# Patient Record
Sex: FEMALE | Race: WHITE | Employment: UNEMPLOYED | URBAN - NONMETROPOLITAN AREA
[De-identification: names, ages, dates, MRNs, and addresses within clinical notes are randomized per-mention and may not be internally consistent; named-entity substitution may affect disease eponyms.]

---

## 2017-11-15 PROBLEM — E03.9 HYPOTHYROIDISM: Status: ACTIVE | Noted: 2017-11-15

## 2017-11-15 PROBLEM — I10 ESSENTIAL HYPERTENSION: Status: ACTIVE | Noted: 2017-11-15

## 2018-01-01 ENCOUNTER — OFFICE VISIT (OUTPATIENT)
Dept: PRIMARY CARE CLINIC | Age: 83
End: 2018-01-01
Payer: MEDICARE

## 2018-01-01 ENCOUNTER — TELEPHONE (OUTPATIENT)
Dept: PRIMARY CARE CLINIC | Age: 83
End: 2018-01-01

## 2018-01-01 ENCOUNTER — HOSPITAL ENCOUNTER (OUTPATIENT)
Dept: INPATIENT UNIT | Age: 83
Setting detail: THERAPIES SERIES
Discharge: HOME | End: 2018-12-28
Payer: MEDICARE

## 2018-01-01 VITALS
SYSTOLIC BLOOD PRESSURE: 106 MMHG | DIASTOLIC BLOOD PRESSURE: 70 MMHG | RESPIRATION RATE: 16 BRPM | HEART RATE: 92 BPM | OXYGEN SATURATION: 96 % | HEIGHT: 58 IN | WEIGHT: 154.1 LBS | BODY MASS INDEX: 32.35 KG/M2

## 2018-01-01 VITALS
SYSTOLIC BLOOD PRESSURE: 118 MMHG | TEMPERATURE: 97.6 F | HEART RATE: 107 BPM | RESPIRATION RATE: 16 BRPM | DIASTOLIC BLOOD PRESSURE: 55 MMHG | OXYGEN SATURATION: 100 %

## 2018-01-01 DIAGNOSIS — I50.9 CHRONIC CONGESTIVE HEART FAILURE, UNSPECIFIED HEART FAILURE TYPE (HCC): ICD-10-CM

## 2018-01-01 DIAGNOSIS — I20.9 ANGINA PECTORIS (HCC): ICD-10-CM

## 2018-01-01 DIAGNOSIS — R00.0 TACHYCARDIA: Primary | ICD-10-CM

## 2018-01-01 DIAGNOSIS — I49.9 IRREGULAR HEART BEAT: ICD-10-CM

## 2018-01-01 DIAGNOSIS — C88.0 WALDENSTROM'S MACROGLOBULINEMIA (HCC): ICD-10-CM

## 2018-01-01 DIAGNOSIS — I48.20 CHRONIC ATRIAL FIBRILLATION (HCC): Primary | ICD-10-CM

## 2018-01-01 PROCEDURE — G8427 DOCREV CUR MEDS BY ELIG CLIN: HCPCS | Performed by: NURSE PRACTITIONER

## 2018-01-01 PROCEDURE — G8482 FLU IMMUNIZE ORDER/ADMIN: HCPCS | Performed by: NURSE PRACTITIONER

## 2018-01-01 PROCEDURE — G8417 CALC BMI ABV UP PARAM F/U: HCPCS | Performed by: NURSE PRACTITIONER

## 2018-01-01 PROCEDURE — 1036F TOBACCO NON-USER: CPT | Performed by: NURSE PRACTITIONER

## 2018-01-01 PROCEDURE — 4040F PNEUMOC VAC/ADMIN/RCVD: CPT | Performed by: NURSE PRACTITIONER

## 2018-01-01 PROCEDURE — 99214 OFFICE O/P EST MOD 30 MIN: CPT | Performed by: NURSE PRACTITIONER

## 2018-01-01 PROCEDURE — P9016 RBC LEUKOCYTES REDUCED: HCPCS

## 2018-01-01 PROCEDURE — 1101F PT FALLS ASSESS-DOCD LE1/YR: CPT | Performed by: NURSE PRACTITIONER

## 2018-01-01 PROCEDURE — 86901 BLOOD TYPING SEROLOGIC RH(D): CPT

## 2018-01-01 PROCEDURE — 86900 BLOOD TYPING SEROLOGIC ABO: CPT

## 2018-01-01 PROCEDURE — 36415 COLL VENOUS BLD VENIPUNCTURE: CPT

## 2018-01-01 PROCEDURE — 86920 COMPATIBILITY TEST SPIN: CPT

## 2018-01-01 PROCEDURE — 1123F ACP DISCUSS/DSCN MKR DOCD: CPT | Performed by: NURSE PRACTITIONER

## 2018-01-01 PROCEDURE — 1090F PRES/ABSN URINE INCON ASSESS: CPT | Performed by: NURSE PRACTITIONER

## 2018-01-01 PROCEDURE — 93000 ELECTROCARDIOGRAM COMPLETE: CPT | Performed by: NURSE PRACTITIONER

## 2018-01-01 PROCEDURE — 2580000003 HC RX 258: Performed by: FAMILY MEDICINE

## 2018-01-01 PROCEDURE — G8598 ASA/ANTIPLAT THER USED: HCPCS | Performed by: NURSE PRACTITIONER

## 2018-01-01 PROCEDURE — 36430 TRANSFUSION BLD/BLD COMPNT: CPT

## 2018-01-01 PROCEDURE — 86850 RBC ANTIBODY SCREEN: CPT

## 2018-01-01 RX ORDER — 0.9 % SODIUM CHLORIDE 0.9 %
250 INTRAVENOUS SOLUTION INTRAVENOUS ONCE
Status: COMPLETED | OUTPATIENT
Start: 2018-01-01 | End: 2018-01-01

## 2018-01-01 RX ORDER — IPRATROPIUM BROMIDE AND ALBUTEROL SULFATE 2.5; .5 MG/3ML; MG/3ML
1 SOLUTION RESPIRATORY (INHALATION) EVERY 4 HOURS PRN
COMMUNITY

## 2018-01-01 RX ORDER — ACETAMINOPHEN 325 MG/1
650 TABLET ORAL EVERY 4 HOURS PRN
COMMUNITY

## 2018-01-01 RX ADMIN — SODIUM CHLORIDE 250 ML: 9 INJECTION, SOLUTION INTRAVENOUS at 12:01

## 2018-01-01 ASSESSMENT — ENCOUNTER SYMPTOMS
NAUSEA: 0
DIARRHEA: 0
SHORTNESS OF BREATH: 1
PHOTOPHOBIA: 0
BACK PAIN: 1
SINUS PRESSURE: 0
CONSTIPATION: 0
SINUS PAIN: 0
COUGH: 0

## 2018-01-01 ASSESSMENT — PATIENT HEALTH QUESTIONNAIRE - PHQ9
2. FEELING DOWN, DEPRESSED OR HOPELESS: 0
SUM OF ALL RESPONSES TO PHQ9 QUESTIONS 1 & 2: 0
SUM OF ALL RESPONSES TO PHQ QUESTIONS 1-9: 0
SUM OF ALL RESPONSES TO PHQ QUESTIONS 1-9: 0
1. LITTLE INTEREST OR PLEASURE IN DOING THINGS: 0

## 2018-11-15 PROBLEM — R55 NEUROCARDIOGENIC SYNCOPE: Status: ACTIVE | Noted: 2018-01-01

## 2018-11-15 PROBLEM — K21.9 GERD (GASTROESOPHAGEAL REFLUX DISEASE): Status: ACTIVE | Noted: 2018-01-01

## 2018-11-15 PROBLEM — I20.9 ANGINA PECTORIS (HCC): Status: ACTIVE | Noted: 2018-01-01

## 2018-11-15 PROBLEM — I25.10 CORONARY ATHEROSCLEROSIS: Status: ACTIVE | Noted: 2018-01-01

## 2018-11-15 PROBLEM — I50.9 CHF (CONGESTIVE HEART FAILURE) (HCC): Status: ACTIVE | Noted: 2018-01-01

## 2018-11-15 PROBLEM — C88.0 WALDENSTROM'S MACROGLOBULINEMIA (HCC): Status: ACTIVE | Noted: 2018-01-01

## 2018-11-15 PROBLEM — J45.909 ASTHMA: Status: ACTIVE | Noted: 2018-01-01

## 2018-12-05 NOTE — PATIENT INSTRUCTIONS
Patient Education        Atrial Fibrillation: Care Instructions  Your Care Instructions    Atrial fibrillation is an irregular and often fast heartbeat. Treating this condition is important for several reasons. It can cause blood clots, which can travel from your heart to your brain and cause a stroke. If you have a fast heartbeat, you may feel lightheaded, dizzy, and weak. An irregular heartbeat can also increase your risk for heart failure. Atrial fibrillation is often the result of another heart condition, such as high blood pressure or coronary artery disease. Making changes to improve your heart condition will help you stay healthy and active. Follow-up care is a key part of your treatment and safety. Be sure to make and go to all appointments, and call your doctor if you are having problems. It's also a good idea to know your test results and keep a list of the medicines you take. How can you care for yourself at home? Medicines    · Take your medicines exactly as prescribed. Call your doctor if you think you are having a problem with your medicine. You will get more details on the specific medicines your doctor prescribes.     · If your doctor has given you a blood thinner to prevent a stroke, be sure you get instructions about how to take your medicine safely. Blood thinners can cause serious bleeding problems.     · Do not take any vitamins, over-the-counter drugs, or herbal products without talking to your doctor first.    Lifestyle changes    · Do not smoke. Smoking can increase your chance of a stroke and heart attack. If you need help quitting, talk to your doctor about stop-smoking programs and medicines. These can increase your chances of quitting for good.     · Eat a heart-healthy diet.     · Stay at a healthy weight. Lose weight if you need to.     · Limit alcohol to 2 drinks a day for men and 1 drink a day for women. Too much alcohol can cause health problems.     · Avoid colds and flu.  Get a pneumococcal vaccine shot. If you have had one before, ask your doctor whether you need another dose. Get a flu shot every year. If you must be around people with colds or flu, wash your hands often. Activity    · If your doctor recommends it, get more exercise. Walking is a good choice. Bit by bit, increase the amount you walk every day. Try for at least 30 minutes on most days of the week. You also may want to swim, bike, or do other activities. Your doctor may suggest that you join a cardiac rehabilitation program so that you can have help increasing your physical activity safely.     · Start light exercise if your doctor says it is okay. Even a small amount will help you get stronger, have more energy, and manage stress. Walking is an easy way to get exercise. Start out by walking a little more than you did in the hospital. Gradually increase the amount you walk.     · When you exercise, watch for signs that your heart is working too hard. You are pushing too hard if you cannot talk while you are exercising. If you become short of breath or dizzy or have chest pain, sit down and rest immediately.     · Check your pulse regularly. Place two fingers on the artery at the palm side of your wrist, in line with your thumb. If your heartbeat seems uneven or fast, talk to your doctor. When should you call for help? Call 911 anytime you think you may need emergency care. For example, call if:    · You have symptoms of a heart attack. These may include:  ? Chest pain or pressure, or a strange feeling in the chest.  ? Sweating. ? Shortness of breath. ? Nausea or vomiting. ? Pain, pressure, or a strange feeling in the back, neck, jaw, or upper belly or in one or both shoulders or arms. ? Lightheadedness or sudden weakness. ? A fast or irregular heartbeat. After you call 911, the  may tell you to chew 1 adult-strength or 2 to 4 low-dose aspirin. Wait for an ambulance.  Do not try to drive yourself.

## 2018-12-05 NOTE — PROGRESS NOTES
daily      diclofenac sodium (VOLTAREN) 1 % GEL Apply 2 g topically 4 times daily as needed for Pain (of upper extremities.) 2 Tube 3    clotrimazole-betamethasone (LOTRISONE) 1-0.05 % cream Apply topically 2 times daily. 1 Tube 2    gabapentin (NEURONTIN) 400 MG capsule Take 1 capsule by mouth 3 times daily for 90 days. . 270 capsule 3    hydrocortisone (ANUSOL-HC) 2.5 % rectal cream Place rectally 2 times daily. 1 Tube 2    trolamine salicylate (ASPERCREME) 10 % cream Apply topically as needed. 1 Tube 5    DULoxetine (CYMBALTA) 30 MG extended release capsule Take 1 capsule by mouth daily 90 capsule 3     No current facility-administered medications for this visit. Allergies   Allergen Reactions    Bactrim [Sulfamethoxazole-Trimethoprim] Nausea Only    Codeine      dizziness    E-Mycin [Erythromycin] Nausea Only    Garlic Diarrhea    Macrobid [Nitrofurantoin Monohyd Macro]     Paba Derivatives     Pcn [Penicillins]     Red Dye     Tape [Adhesive Tape]      Skin comes off    Yellow Dyes (Non-Tartrazine)     Shellfish-Derived Products Rash       Health Maintenance   Topic Date Due    Shingles Vaccine (1 of 2 - 2 Dose Series) 01[birth year]01/1976    DTaP/Tdap/Td vaccine (2 - Td) 04/27/2015    Potassium monitoring  06/01/2019    Creatinine monitoring  06/01/2019    TSH testing  10/05/2019    Flu vaccine  Completed    Pneumococcal low/med risk  Completed       Subjective:      Review of Systems   Constitutional: Positive for fatigue. Negative for chills and fever. HENT: Negative for congestion, nosebleeds, sinus pain and sinus pressure. Eyes: Negative for photophobia and visual disturbance. Respiratory: Positive for shortness of breath. Negative for cough. Cardiovascular: Positive for leg swelling. Negative for chest pain and palpitations. Gastrointestinal: Negative for constipation, diarrhea and nausea. Genitourinary: Negative for difficulty urinating and dysuria.    Musculoskeletal:

## 2018-12-28 NOTE — PROGRESS NOTES
Patient arrived to infusion center per wheelchair accompanied by daughter from [facility], patient's hgb 7.0  ordered one unit of PRBC to be transfused, reviewed allergies and home medications, blood consent signed by daughter for patient, explained the blood transfusion risks and procedure of transfusion, patient alert and oriented, pale, luns diminished, bilateral lower leg edema 3+, up most of night with diarrhea according to patient, Labs drawn for type and screen , # 20 IV started to right upper forearm, Transfusion [place] at [id number], VS as charted, Toño sent patient's 1200 medications, held Lasix and potassium due to low BP 86/35 at beginning of infusion, transfusion completed at 1530, Lungs diminished, BP improved, BM x2 with urine, loose no pablo blood noted, discharged per wheelchair with daughter

## 2019-01-01 ENCOUNTER — TELEPHONE (OUTPATIENT)
Dept: PRIMARY CARE CLINIC | Age: 84
End: 2019-01-01

## 2019-01-01 ENCOUNTER — OFFICE VISIT (OUTPATIENT)
Dept: ONCOLOGY | Age: 84
End: 2019-01-01
Payer: MEDICARE

## 2019-01-01 ENCOUNTER — OFFICE VISIT (OUTPATIENT)
Dept: PRIMARY CARE CLINIC | Age: 84
End: 2019-01-01
Payer: MEDICARE

## 2019-01-01 ENCOUNTER — HOSPITAL ENCOUNTER (OUTPATIENT)
Age: 84
Discharge: HOME | End: 2019-01-09
Payer: MEDICARE

## 2019-01-01 ENCOUNTER — OFFICE VISIT (OUTPATIENT)
Dept: GASTROENTEROLOGY | Age: 84
End: 2019-01-01
Payer: MEDICARE

## 2019-01-01 ENCOUNTER — HOSPITAL ENCOUNTER (OUTPATIENT)
Age: 84
Discharge: HOME | End: 2019-02-25
Payer: MEDICARE

## 2019-01-01 ENCOUNTER — INITIAL CONSULT (OUTPATIENT)
Dept: ONCOLOGY | Age: 84
End: 2019-01-01
Payer: MEDICARE

## 2019-01-01 ENCOUNTER — HOSPITAL ENCOUNTER (OUTPATIENT)
Dept: INFUSION THERAPY | Age: 84
Discharge: HOME | End: 2019-01-11
Payer: MEDICARE

## 2019-01-01 ENCOUNTER — TELEPHONE (OUTPATIENT)
Dept: ONCOLOGY | Age: 84
End: 2019-01-01

## 2019-01-01 ENCOUNTER — HOSPITAL ENCOUNTER (OUTPATIENT)
Age: 84
Setting detail: OBSERVATION
Discharge: HOME | End: 2019-01-05
Attending: FAMILY MEDICINE | Admitting: FAMILY MEDICINE
Payer: MEDICARE

## 2019-01-01 ENCOUNTER — HOSPITAL ENCOUNTER (OUTPATIENT)
Age: 84
Setting detail: SPECIMEN
Discharge: HOME | End: 2019-01-11
Payer: MEDICARE

## 2019-01-01 VITALS
TEMPERATURE: 98.2 F | WEIGHT: 161.31 LBS | DIASTOLIC BLOOD PRESSURE: 86 MMHG | HEIGHT: 55 IN | SYSTOLIC BLOOD PRESSURE: 153 MMHG | BODY MASS INDEX: 37.33 KG/M2 | HEART RATE: 106 BPM

## 2019-01-01 VITALS
HEART RATE: 108 BPM | SYSTOLIC BLOOD PRESSURE: 117 MMHG | DIASTOLIC BLOOD PRESSURE: 67 MMHG | TEMPERATURE: 98.7 F | WEIGHT: 153 LBS | BODY MASS INDEX: 35.56 KG/M2

## 2019-01-01 VITALS
SYSTOLIC BLOOD PRESSURE: 120 MMHG | BODY MASS INDEX: 36.75 KG/M2 | HEIGHT: 55 IN | HEART RATE: 81 BPM | DIASTOLIC BLOOD PRESSURE: 80 MMHG | WEIGHT: 158.8 LBS | OXYGEN SATURATION: 91 % | RESPIRATION RATE: 17 BRPM

## 2019-01-01 VITALS
SYSTOLIC BLOOD PRESSURE: 98 MMHG | DIASTOLIC BLOOD PRESSURE: 60 MMHG | BODY MASS INDEX: 35.42 KG/M2 | OXYGEN SATURATION: 98 % | WEIGHT: 152.4 LBS | HEART RATE: 59 BPM | TEMPERATURE: 97.6 F

## 2019-01-01 VITALS
DIASTOLIC BLOOD PRESSURE: 65 MMHG | TEMPERATURE: 98 F | BODY MASS INDEX: 35.47 KG/M2 | SYSTOLIC BLOOD PRESSURE: 115 MMHG | WEIGHT: 152.6 LBS | HEART RATE: 95 BPM

## 2019-01-01 VITALS
WEIGHT: 159.61 LBS | BODY MASS INDEX: 35.91 KG/M2 | TEMPERATURE: 97.9 F | OXYGEN SATURATION: 96 % | DIASTOLIC BLOOD PRESSURE: 58 MMHG | HEART RATE: 109 BPM | RESPIRATION RATE: 16 BRPM | SYSTOLIC BLOOD PRESSURE: 142 MMHG | HEIGHT: 56 IN

## 2019-01-01 VITALS
HEIGHT: 56 IN | SYSTOLIC BLOOD PRESSURE: 125 MMHG | BODY MASS INDEX: 35.99 KG/M2 | HEART RATE: 111 BPM | DIASTOLIC BLOOD PRESSURE: 66 MMHG | WEIGHT: 160 LBS

## 2019-01-01 VITALS
TEMPERATURE: 97.9 F | HEART RATE: 108 BPM | DIASTOLIC BLOOD PRESSURE: 86 MMHG | RESPIRATION RATE: 17 BRPM | SYSTOLIC BLOOD PRESSURE: 130 MMHG

## 2019-01-01 DIAGNOSIS — I10 ESSENTIAL HYPERTENSION: ICD-10-CM

## 2019-01-01 DIAGNOSIS — F51.01 PRIMARY INSOMNIA: ICD-10-CM

## 2019-01-01 DIAGNOSIS — D72.819 LEUKOPENIA, UNSPECIFIED TYPE: ICD-10-CM

## 2019-01-01 DIAGNOSIS — R60.0 LOCALIZED EDEMA: ICD-10-CM

## 2019-01-01 DIAGNOSIS — D64.9 LOW HEMOGLOBIN: ICD-10-CM

## 2019-01-01 DIAGNOSIS — D64.9 ANEMIA, UNSPECIFIED TYPE: ICD-10-CM

## 2019-01-01 DIAGNOSIS — D47.2 IGM LAMBDA MONOCLONAL GAMMOPATHY: Primary | ICD-10-CM

## 2019-01-01 DIAGNOSIS — R06.02 SHORTNESS OF BREATH: ICD-10-CM

## 2019-01-01 DIAGNOSIS — I50.9 CHRONIC CONGESTIVE HEART FAILURE, UNSPECIFIED HEART FAILURE TYPE (HCC): ICD-10-CM

## 2019-01-01 DIAGNOSIS — K21.9 GASTROESOPHAGEAL REFLUX DISEASE WITHOUT ESOPHAGITIS: Primary | ICD-10-CM

## 2019-01-01 DIAGNOSIS — D69.6 THROMBOCYTOPENIA (HCC): ICD-10-CM

## 2019-01-01 DIAGNOSIS — D64.9 ACUTE ANEMIA: Primary | ICD-10-CM

## 2019-01-01 DIAGNOSIS — D47.2 IGM LAMBDA MONOCLONAL GAMMOPATHY: ICD-10-CM

## 2019-01-01 DIAGNOSIS — M54.42 CHRONIC MIDLINE LOW BACK PAIN WITH LEFT-SIDED SCIATICA: ICD-10-CM

## 2019-01-01 DIAGNOSIS — J01.00 ACUTE NON-RECURRENT MAXILLARY SINUSITIS: ICD-10-CM

## 2019-01-01 DIAGNOSIS — C88.0 WALDENSTROM'S MACROGLOBULINEMIA (HCC): ICD-10-CM

## 2019-01-01 DIAGNOSIS — G89.29 CHRONIC BILATERAL LOW BACK PAIN, WITH SCIATICA PRESENCE UNSPECIFIED: ICD-10-CM

## 2019-01-01 DIAGNOSIS — R10.9 ABDOMINAL PAIN, UNSPECIFIED ABDOMINAL LOCATION: ICD-10-CM

## 2019-01-01 DIAGNOSIS — C88.0 WALDENSTROM'S MACROGLOBULINEMIA (HCC): Primary | ICD-10-CM

## 2019-01-01 DIAGNOSIS — I20.9 ANGINA PECTORIS (HCC): ICD-10-CM

## 2019-01-01 DIAGNOSIS — D64.9 ACUTE ANEMIA: ICD-10-CM

## 2019-01-01 DIAGNOSIS — R19.7 DIARRHEA, UNSPECIFIED TYPE: ICD-10-CM

## 2019-01-01 DIAGNOSIS — N18.4 STAGE 4 CHRONIC KIDNEY DISEASE (HCC): ICD-10-CM

## 2019-01-01 DIAGNOSIS — D64.9 ANEMIA, UNSPECIFIED TYPE: Primary | ICD-10-CM

## 2019-01-01 DIAGNOSIS — K62.5 RECTAL BLEEDING: ICD-10-CM

## 2019-01-01 DIAGNOSIS — M54.5 CHRONIC BILATERAL LOW BACK PAIN, WITH SCIATICA PRESENCE UNSPECIFIED: ICD-10-CM

## 2019-01-01 DIAGNOSIS — I48.20 CHRONIC ATRIAL FIBRILLATION (HCC): Primary | ICD-10-CM

## 2019-01-01 DIAGNOSIS — G89.29 CHRONIC MIDLINE LOW BACK PAIN WITH LEFT-SIDED SCIATICA: ICD-10-CM

## 2019-01-01 DIAGNOSIS — E03.9 HYPOTHYROIDISM, UNSPECIFIED TYPE: ICD-10-CM

## 2019-01-01 DIAGNOSIS — M51.37 DEGENERATION OF LUMBAR OR LUMBOSACRAL INTERVERTEBRAL DISC: ICD-10-CM

## 2019-01-01 LAB
ABO/RH: NORMAL
ABSOLUTE BANDS #: 0.05 K/UL (ref 0–1)
ABSOLUTE EOS #: 0.03 K/UL (ref 0–0.4)
ABSOLUTE EOS #: 0.03 K/UL (ref 0–0.4)
ABSOLUTE EOS #: 0.08 K/UL (ref 0–0.4)
ABSOLUTE EOS #: 0.1 K/UL (ref 0–0.4)
ABSOLUTE IMMATURE GRANULOCYTE: ABNORMAL K/UL (ref 0–0.3)
ABSOLUTE LYMPH #: 0.53 K/UL (ref 1–4.8)
ABSOLUTE LYMPH #: 0.58 K/UL (ref 1–4.8)
ABSOLUTE LYMPH #: 0.6 K/UL (ref 1–4.8)
ABSOLUTE LYMPH #: 0.78 K/UL (ref 1–4.8)
ABSOLUTE MONO #: 0.23 K/UL (ref 0.1–1.3)
ABSOLUTE MONO #: 0.3 K/UL (ref 0.1–1.2)
ABSOLUTE MONO #: 0.31 K/UL (ref 0.1–1.2)
ABSOLUTE MONO #: 0.33 K/UL (ref 0.1–1.3)
ABSOLUTE RETIC #: 0.06 M/UL (ref 0.02–0.1)
ALBUMIN (CALCULATED): 3.6 G/DL (ref 3.2–5.2)
ALBUMIN (CALCULATED): 3.7 G/DL (ref 3.2–5.2)
ALBUMIN PERCENT: 50 % (ref 45–65)
ALBUMIN PERCENT: 51 % (ref 45–65)
ALBUMIN SERPL-MCNC: 3.2 G/DL (ref 3.5–5.2)
ALBUMIN SERPL-MCNC: 3.4 G/DL (ref 3.5–5.2)
ALBUMIN, U: DETECTED
ALBUMIN/GLOBULIN RATIO: 0.7 (ref 1–2.5)
ALBUMIN/GLOBULIN RATIO: 0.7 (ref 1–2.5)
ALP BLD-CCNC: 85 U/L (ref 35–104)
ALP BLD-CCNC: 90 U/L (ref 35–104)
ALPHA 1 PERCENT: 3 % (ref 3–6)
ALPHA 1 PERCENT: 4 % (ref 3–6)
ALPHA 1, URINE: DETECTED
ALPHA 2 PERCENT: 10 % (ref 6–13)
ALPHA 2 PERCENT: 13 % (ref 6–13)
ALPHA 2, URINE: DETECTED
ALPHA-1-GLOBULIN: 0.2 G/DL (ref 0.1–0.4)
ALPHA-1-GLOBULIN: 0.3 G/DL (ref 0.1–0.4)
ALPHA-2-GLOBULIN: 0.7 G/DL (ref 0.5–0.9)
ALPHA-2-GLOBULIN: 0.9 G/DL (ref 0.5–0.9)
ALT SERPL-CCNC: 15 U/L (ref 5–33)
ALT SERPL-CCNC: 22 U/L (ref 5–33)
ANION GAP SERPL CALCULATED.3IONS-SCNC: 10 MMOL/L (ref 9–17)
ANION GAP SERPL CALCULATED.3IONS-SCNC: 11 MMOL/L (ref 9–17)
ANION GAP SERPL CALCULATED.3IONS-SCNC: 11 MMOL/L (ref 9–17)
ANION GAP SERPL CALCULATED.3IONS-SCNC: 14 MMOL/L (ref 9–17)
ANTIBODY SCREEN: NEGATIVE
ARM BAND NUMBER: NORMAL
AST SERPL-CCNC: 20 U/L
AST SERPL-CCNC: 25 U/L
BANDS: 2 % (ref 0–10)
BASOPHILS # BLD: 0 % (ref 0–2)
BASOPHILS # BLD: 0 % (ref 0–2)
BASOPHILS # BLD: 1 % (ref 0–2)
BASOPHILS # BLD: 1 % (ref 0–2)
BASOPHILS ABSOLUTE: 0 K/UL (ref 0–0.2)
BASOPHILS ABSOLUTE: 0 K/UL (ref 0–0.2)
BASOPHILS ABSOLUTE: 0.03 K/UL (ref 0–0.2)
BASOPHILS ABSOLUTE: 0.03 K/UL (ref 0–0.2)
BETA GLOBULIN: 0.6 G/DL (ref 0.5–1.1)
BETA GLOBULIN: 0.7 G/DL (ref 0.5–1.1)
BETA PERCENT: 8 % (ref 11–19)
BETA PERCENT: 9 % (ref 11–19)
BETA URINE: DETECTED
BETA-2 MICROGLOBULIN: 6.6 MG/L (ref 0.6–2.4)
BETA-2 MICROGLOBULIN: 8.8 MG/L (ref 0.6–2.4)
BILIRUB SERPL-MCNC: 0.32 MG/DL (ref 0.3–1.2)
BILIRUB SERPL-MCNC: 0.45 MG/DL (ref 0.3–1.2)
BLD PROD TYP BPU: NORMAL
BLOOD BANK COMMENT: NORMAL
BUN BLDV-MCNC: 37 MG/DL (ref 8–23)
BUN BLDV-MCNC: 45 MG/DL (ref 8–23)
BUN BLDV-MCNC: 48 MG/DL (ref 8–23)
BUN BLDV-MCNC: 53 MG/DL (ref 8–23)
BUN/CREAT BLD: ABNORMAL (ref 9–20)
CALCIUM SERPL-MCNC: 8.6 MG/DL (ref 8.6–10.4)
CALCIUM SERPL-MCNC: 8.8 MG/DL (ref 8.6–10.4)
CALCIUM SERPL-MCNC: 9.2 MG/DL (ref 8.6–10.4)
CALCIUM SERPL-MCNC: 9.5 MG/DL (ref 8.6–10.4)
CHLORIDE BLD-SCNC: 102 MMOL/L (ref 98–107)
CHLORIDE BLD-SCNC: 103 MMOL/L (ref 98–107)
CHLORIDE BLD-SCNC: 98 MMOL/L (ref 98–107)
CHLORIDE BLD-SCNC: 99 MMOL/L (ref 98–107)
CO2: 21 MMOL/L (ref 20–31)
CO2: 23 MMOL/L (ref 20–31)
CO2: 24 MMOL/L (ref 20–31)
CO2: 27 MMOL/L (ref 20–31)
CREAT SERPL-MCNC: 1.49 MG/DL (ref 0.5–0.9)
CREAT SERPL-MCNC: 1.59 MG/DL (ref 0.5–0.9)
CREAT SERPL-MCNC: 1.96 MG/DL (ref 0.5–0.9)
CREAT SERPL-MCNC: 2.14 MG/DL (ref 0.5–0.9)
CROSSMATCH RESULT: NORMAL
DATE, STOOL #1: NORMAL
DATE, STOOL #2: NORMAL
DATE, STOOL #3: NORMAL
DIFFERENTIAL TYPE: ABNORMAL
DISPENSE STATUS BLOOD BANK: NORMAL
EOSINOPHILS RELATIVE PERCENT: 1 % (ref 0–4)
EOSINOPHILS RELATIVE PERCENT: 1 % (ref 0–4)
EOSINOPHILS RELATIVE PERCENT: 1 % (ref 1–4)
EOSINOPHILS RELATIVE PERCENT: 3 % (ref 1–4)
EXPIRATION DATE: NORMAL
FERRITIN: 188 UG/L (ref 13–150)
FLOW CYTOMETRY BL: NORMAL
FOLATE: >20 NG/ML
FREE KAPPA LT CHAINS,UR: 21.6 MG/DL (ref 0.14–2.42)
FREE KAPPA/LAMBDA RATIO: 0.43 (ref 0.26–1.65)
FREE LAMBDA LT CHAINS,UR: 20.8 MG/DL (ref 0.02–0.67)
FREE UR LAMBDA EXCRETION/DAY: 96.93 MG/D
GAMMA GLOBULIN %: 23 % (ref 9–20)
GAMMA GLOBULIN %: 28 % (ref 9–20)
GAMMA GLOBULIN, URINE: DETECTED
GAMMA GLOBULIN: 1.7 G/DL (ref 0.5–1.5)
GAMMA GLOBULIN: 2 G/DL (ref 0.5–1.5)
GFR AFRICAN AMERICAN: 26 ML/MIN
GFR AFRICAN AMERICAN: 29 ML/MIN
GFR AFRICAN AMERICAN: 37 ML/MIN
GFR AFRICAN AMERICAN: 40 ML/MIN
GFR NON-AFRICAN AMERICAN: 22 ML/MIN
GFR NON-AFRICAN AMERICAN: 24 ML/MIN
GFR NON-AFRICAN AMERICAN: 30 ML/MIN
GFR NON-AFRICAN AMERICAN: 33 ML/MIN
GFR SERPL CREATININE-BSD FRML MDRD: ABNORMAL ML/MIN/{1.73_M2}
GLUCOSE BLD-MCNC: 159 MG/DL (ref 70–99)
GLUCOSE BLD-MCNC: 176 MG/DL (ref 70–99)
GLUCOSE BLD-MCNC: 179 MG/DL (ref 70–99)
GLUCOSE BLD-MCNC: 89 MG/DL (ref 70–99)
HAPTOGLOBIN: 281 MG/DL (ref 30–200)
HAPTOGLOBIN: 367 MG/DL (ref 30–200)
HCT VFR BLD CALC: 23 % (ref 36–46)
HCT VFR BLD CALC: 24.1 % (ref 36–46)
HCT VFR BLD CALC: 24.3 % (ref 36–46)
HCT VFR BLD CALC: 25.3 % (ref 36–46)
HEMOCCULT SP1 STL QL: NEGATIVE
HEMOCCULT SP2 STL QL: NORMAL
HEMOCCULT SP3 STL QL: NORMAL
HEMOGLOBIN: 7.3 G/DL (ref 12–16)
HEMOGLOBIN: 7.6 G/DL (ref 12–16)
HEMOGLOBIN: 7.9 G/DL (ref 12–16)
HEMOGLOBIN: 8.1 G/DL (ref 12–16)
HOURS COLLECTED: 24
IGA: 171 MG/DL (ref 70–400)
IGG: 1008 MG/DL (ref 700–1600)
IGM: 2139 MG/DL (ref 40–230)
IMMATURE GRANULOCYTES: ABNORMAL %
IMMATURE RETIC FRACT: ABNORMAL %
IRON SATURATION: 37 % (ref 20–55)
IRON: 67 UG/DL (ref 37–145)
KAPPA FREE LIGHT CHAINS QNT: 13.29 MG/DL (ref 0.37–1.94)
KAPPA LAMBDA URINE INTERP: ABNORMAL
KAPPA/LAMBDA RATIO,U: 1.04 RATIO (ref 2.04–10.37)
LACTATE DEHYDROGENASE: 232 U/L (ref 135–214)
LACTATE DEHYDROGENASE: 293 U/L (ref 135–214)
LAMBDA FREE LIGHT CHAINS QNT: 30.73 MG/DL (ref 0.57–2.63)
LD ISOENZYME 1: 19 % (ref 14–27)
LD ISOENZYME 2: 35 % (ref 29–42)
LD ISOENZYME 3: 26 % (ref 18–30)
LD ISOENZYME 4: 9 % (ref 8–15)
LD ISOENZYME 5: 11 % (ref 6–23)
LD TOTAL: 239 U/L (ref 105–230)
LYMPHOCYTES # BLD: 14 % (ref 24–44)
LYMPHOCYTES # BLD: 21 % (ref 24–44)
LYMPHOCYTES # BLD: 23 % (ref 24–44)
LYMPHOCYTES # BLD: 28 % (ref 24–44)
MCH RBC QN AUTO: 31.3 PG (ref 26–34)
MCH RBC QN AUTO: 31.5 PG (ref 26–34)
MCH RBC QN AUTO: 31.7 PG (ref 26–34)
MCH RBC QN AUTO: 32.1 PG (ref 26–34)
MCHC RBC AUTO-ENTMCNC: 31.3 G/DL (ref 31–37)
MCHC RBC AUTO-ENTMCNC: 31.6 G/DL (ref 31–37)
MCHC RBC AUTO-ENTMCNC: 32.1 G/DL (ref 31–37)
MCHC RBC AUTO-ENTMCNC: 32.7 G/DL (ref 31–37)
MCV RBC AUTO: 102.5 FL (ref 80–100)
MCV RBC AUTO: 97 FL (ref 80–100)
MCV RBC AUTO: 98.4 FL (ref 80–100)
MCV RBC AUTO: 98.9 FL (ref 80–100)
MONOCYTES # BLD: 11 % (ref 2–11)
MONOCYTES # BLD: 13 % (ref 1–7)
MONOCYTES # BLD: 6 % (ref 2–11)
MONOCYTES # BLD: 9 % (ref 1–7)
MORPHOLOGY: ABNORMAL
NRBC AUTOMATED: ABNORMAL PER 100 WBC
PATHOLOGIST REVIEW: NORMAL
PATHOLOGIST: ABNORMAL
PATHOLOGIST: ABNORMAL
PATHOLOGIST: NORMAL
PATHOLOGIST: NORMAL
PDW BLD-RTO: 17.6 % (ref 11.5–14.9)
PDW BLD-RTO: 17.6 % (ref 11.5–14.9)
PDW BLD-RTO: 18 % (ref 12.5–15.4)
PDW BLD-RTO: 20.3 % (ref 12.5–15.4)
PLATELET # BLD: 100 K/UL (ref 140–450)
PLATELET # BLD: 52 K/UL (ref 140–450)
PLATELET # BLD: 83 K/UL (ref 150–450)
PLATELET # BLD: 88 K/UL (ref 150–450)
PLATELET ESTIMATE: ABNORMAL
PMV BLD AUTO: 10 FL (ref 8–14)
PMV BLD AUTO: 7.4 FL (ref 6–12)
PMV BLD AUTO: 7.6 FL (ref 6–12)
PMV BLD AUTO: 8.2 FL (ref 6–12)
POTASSIUM SERPL-SCNC: 4.3 MMOL/L (ref 3.7–5.3)
POTASSIUM SERPL-SCNC: 4.7 MMOL/L (ref 3.7–5.3)
POTASSIUM SERPL-SCNC: 4.8 MMOL/L (ref 3.7–5.3)
POTASSIUM SERPL-SCNC: 5.1 MMOL/L (ref 3.7–5.3)
PROTEIN ELECTROPHORESIS, SERUM: ABNORMAL
PROTEIN ELECTROPHORESIS, SERUM: ABNORMAL
PROTEIN, TOTAL URINE: 204 MG/D (ref 10–140)
RBC # BLD: 2.33 M/UL (ref 4–5.2)
RBC # BLD: 2.37 M/UL (ref 4–5.2)
RBC # BLD: 2.48 M/UL (ref 4–5.2)
RBC # BLD: 2.58 M/UL (ref 4–5.2)
RBC # BLD: ABNORMAL 10*6/UL
RETIC %: 2.3 % (ref 0.5–2)
RETIC HEMOGLOBIN: ABNORMAL PG (ref 28.2–35.7)
SEG NEUTROPHILS: 57 % (ref 36–66)
SEG NEUTROPHILS: 64 % (ref 36–66)
SEG NEUTROPHILS: 65 % (ref 36–66)
SEG NEUTROPHILS: 79 % (ref 36–66)
SEGMENTED NEUTROPHILS ABSOLUTE COUNT: 1.58 K/UL (ref 1.3–9.1)
SEGMENTED NEUTROPHILS ABSOLUTE COUNT: 1.6 K/UL (ref 1.8–7.7)
SEGMENTED NEUTROPHILS ABSOLUTE COUNT: 1.61 K/UL (ref 1.3–9.1)
SEGMENTED NEUTROPHILS ABSOLUTE COUNT: 3.2 K/UL (ref 1.8–7.7)
SEROTONIN BLOOD: 19 NG/ML (ref 50–220)
SERUM IFX INTERP: NORMAL
SERUM IFX INTERP: NORMAL
SODIUM BLD-SCNC: 135 MMOL/L (ref 135–144)
SODIUM BLD-SCNC: 136 MMOL/L (ref 135–144)
SURGICAL PATHOLOGY REPORT: NORMAL
TIME, STOOL #1: NORMAL
TIME, STOOL #2: NORMAL
TIME, STOOL #3: NORMAL
TOTAL IRON BINDING CAPACITY: 182 UG/DL (ref 250–450)
TOTAL PROT. SUM,%: 100 % (ref 98–102)
TOTAL PROT. SUM,%: 99 % (ref 98–102)
TOTAL PROT. SUM: 7.2 G/DL (ref 6.3–8.2)
TOTAL PROT. SUM: 7.2 G/DL (ref 6.3–8.2)
TOTAL PROTEIN: 7.1 G/DL (ref 6.4–8.3)
TOTAL PROTEIN: 7.2 G/DL (ref 6.4–8.3)
TOTAL PROTEIN: 7.5 G/DL (ref 6.4–8.3)
TOTAL PROTEIN: 8 G/DL (ref 6.4–8.3)
TRANSFUSION STATUS: NORMAL
UNIT DIVISION: 0
UNIT NUMBER: NORMAL
UNSATURATED IRON BINDING CAPACITY: 115 UG/DL (ref 112–347)
URINE FREE KAPPA EXCRETION/DAY: 100.66 MG/D
URINE VOLUME: 466
VITAMIN B-12: >2000 PG/ML (ref 232–1245)
VITAMIN B-12: >2000 PG/ML (ref 232–1245)
WBC # BLD: 2.5 K/UL (ref 3.5–11)
WBC # BLD: 2.5 K/UL (ref 3.5–11)
WBC # BLD: 2.8 K/UL (ref 3.5–11)
WBC # BLD: 4.1 K/UL (ref 3.5–11)
WBC # BLD: ABNORMAL 10*3/UL

## 2019-01-01 PROCEDURE — 84155 ASSAY OF PROTEIN SERUM: CPT

## 2019-01-01 PROCEDURE — 1090F PRES/ABSN URINE INCON ASSESS: CPT | Performed by: INTERNAL MEDICINE

## 2019-01-01 PROCEDURE — 83615 LACTATE (LD) (LDH) ENZYME: CPT

## 2019-01-01 PROCEDURE — 99204 OFFICE O/P NEW MOD 45 MIN: CPT | Performed by: INTERNAL MEDICINE

## 2019-01-01 PROCEDURE — 99214 OFFICE O/P EST MOD 30 MIN: CPT | Performed by: INTERNAL MEDICINE

## 2019-01-01 PROCEDURE — 6370000000 HC RX 637 (ALT 250 FOR IP): Performed by: FAMILY MEDICINE

## 2019-01-01 PROCEDURE — 86850 RBC ANTIBODY SCREEN: CPT

## 2019-01-01 PROCEDURE — 81050 URINALYSIS VOLUME MEASURE: CPT

## 2019-01-01 PROCEDURE — 36415 COLL VENOUS BLD VENIPUNCTURE: CPT

## 2019-01-01 PROCEDURE — G8417 CALC BMI ABV UP PARAM F/U: HCPCS | Performed by: INTERNAL MEDICINE

## 2019-01-01 PROCEDURE — 1101F PT FALLS ASSESS-DOCD LE1/YR: CPT | Performed by: INTERNAL MEDICINE

## 2019-01-01 PROCEDURE — 83010 ASSAY OF HAPTOGLOBIN QUANT: CPT

## 2019-01-01 PROCEDURE — G8598 ASA/ANTIPLAT THER USED: HCPCS | Performed by: NURSE PRACTITIONER

## 2019-01-01 PROCEDURE — 80048 BASIC METABOLIC PNL TOTAL CA: CPT

## 2019-01-01 PROCEDURE — 86334 IMMUNOFIX E-PHORESIS SERUM: CPT

## 2019-01-01 PROCEDURE — 4040F PNEUMOC VAC/ADMIN/RCVD: CPT | Performed by: NURSE PRACTITIONER

## 2019-01-01 PROCEDURE — 80053 COMPREHEN METABOLIC PANEL: CPT

## 2019-01-01 PROCEDURE — G0378 HOSPITAL OBSERVATION PER HR: HCPCS

## 2019-01-01 PROCEDURE — 85025 COMPLETE CBC W/AUTO DIFF WBC: CPT

## 2019-01-01 PROCEDURE — 85045 AUTOMATED RETICULOCYTE COUNT: CPT

## 2019-01-01 PROCEDURE — 1036F TOBACCO NON-USER: CPT | Performed by: INTERNAL MEDICINE

## 2019-01-01 PROCEDURE — 82272 OCCULT BLD FECES 1-3 TESTS: CPT

## 2019-01-01 PROCEDURE — 86901 BLOOD TYPING SEROLOGIC RH(D): CPT

## 2019-01-01 PROCEDURE — 1123F ACP DISCUSS/DSCN MKR DOCD: CPT | Performed by: NURSE PRACTITIONER

## 2019-01-01 PROCEDURE — 4040F PNEUMOC VAC/ADMIN/RCVD: CPT | Performed by: INTERNAL MEDICINE

## 2019-01-01 PROCEDURE — 88184 FLOWCYTOMETRY/ TC 1 MARKER: CPT

## 2019-01-01 PROCEDURE — 83540 ASSAY OF IRON: CPT

## 2019-01-01 PROCEDURE — G8427 DOCREV CUR MEDS BY ELIG CLIN: HCPCS | Performed by: INTERNAL MEDICINE

## 2019-01-01 PROCEDURE — 84260 ASSAY OF SEROTONIN: CPT

## 2019-01-01 PROCEDURE — 82607 VITAMIN B-12: CPT

## 2019-01-01 PROCEDURE — 86920 COMPATIBILITY TEST SPIN: CPT

## 2019-01-01 PROCEDURE — 83625 ASSAY OF LDH ENZYMES: CPT

## 2019-01-01 PROCEDURE — 82728 ASSAY OF FERRITIN: CPT

## 2019-01-01 PROCEDURE — 99219 PR INITIAL OBSERVATION CARE/DAY 50 MINUTES: CPT | Performed by: FAMILY MEDICINE

## 2019-01-01 PROCEDURE — G8482 FLU IMMUNIZE ORDER/ADMIN: HCPCS | Performed by: INTERNAL MEDICINE

## 2019-01-01 PROCEDURE — G8417 CALC BMI ABV UP PARAM F/U: HCPCS | Performed by: NURSE PRACTITIONER

## 2019-01-01 PROCEDURE — 83883 ASSAY NEPHELOMETRY NOT SPEC: CPT

## 2019-01-01 PROCEDURE — 83550 IRON BINDING TEST: CPT

## 2019-01-01 PROCEDURE — 86335 IMMUNFIX E-PHORSIS/URINE/CSF: CPT

## 2019-01-01 PROCEDURE — 1036F TOBACCO NON-USER: CPT | Performed by: NURSE PRACTITIONER

## 2019-01-01 PROCEDURE — 82746 ASSAY OF FOLIC ACID SERUM: CPT

## 2019-01-01 PROCEDURE — 82232 ASSAY OF BETA-2 PROTEIN: CPT

## 2019-01-01 PROCEDURE — 86900 BLOOD TYPING SEROLOGIC ABO: CPT

## 2019-01-01 PROCEDURE — 99211 OFF/OP EST MAY X REQ PHY/QHP: CPT | Performed by: INTERNAL MEDICINE

## 2019-01-01 PROCEDURE — 99214 OFFICE O/P EST MOD 30 MIN: CPT | Performed by: NURSE PRACTITIONER

## 2019-01-01 PROCEDURE — G8427 DOCREV CUR MEDS BY ELIG CLIN: HCPCS | Performed by: NURSE PRACTITIONER

## 2019-01-01 PROCEDURE — 1090F PRES/ABSN URINE INCON ASSESS: CPT | Performed by: NURSE PRACTITIONER

## 2019-01-01 PROCEDURE — 1123F ACP DISCUSS/DSCN MKR DOCD: CPT | Performed by: INTERNAL MEDICINE

## 2019-01-01 PROCEDURE — G0379 DIRECT REFER HOSPITAL OBSERV: HCPCS

## 2019-01-01 PROCEDURE — 84156 ASSAY OF PROTEIN URINE: CPT

## 2019-01-01 PROCEDURE — 36430 TRANSFUSION BLD/BLD COMPNT: CPT

## 2019-01-01 PROCEDURE — 6360000002 HC RX W HCPCS: Performed by: INTERNAL MEDICINE

## 2019-01-01 PROCEDURE — G8598 ASA/ANTIPLAT THER USED: HCPCS | Performed by: INTERNAL MEDICINE

## 2019-01-01 PROCEDURE — 88185 FLOWCYTOMETRY/TC ADD-ON: CPT

## 2019-01-01 PROCEDURE — 96374 THER/PROPH/DIAG INJ IV PUSH: CPT

## 2019-01-01 PROCEDURE — 99201 HC NEW PT, E/M LEVEL 1: CPT | Performed by: INTERNAL MEDICINE

## 2019-01-01 PROCEDURE — 1101F PT FALLS ASSESS-DOCD LE1/YR: CPT | Performed by: NURSE PRACTITIONER

## 2019-01-01 PROCEDURE — 82784 ASSAY IGA/IGD/IGG/IGM EACH: CPT

## 2019-01-01 PROCEDURE — 2580000003 HC RX 258: Performed by: INTERNAL MEDICINE

## 2019-01-01 PROCEDURE — 84165 PROTEIN E-PHORESIS SERUM: CPT

## 2019-01-01 PROCEDURE — G8482 FLU IMMUNIZE ORDER/ADMIN: HCPCS | Performed by: NURSE PRACTITIONER

## 2019-01-01 PROCEDURE — P9016 RBC LEUKOCYTES REDUCED: HCPCS

## 2019-01-01 RX ORDER — ALBUTEROL SULFATE 2.5 MG/3ML
2.5 SOLUTION RESPIRATORY (INHALATION) EVERY 6 HOURS PRN
Status: DISCONTINUED | OUTPATIENT
Start: 2019-01-01 | End: 2019-01-01 | Stop reason: HOSPADM

## 2019-01-01 RX ORDER — 0.9 % SODIUM CHLORIDE 0.9 %
250 INTRAVENOUS SOLUTION INTRAVENOUS ONCE
Status: COMPLETED | OUTPATIENT
Start: 2019-01-01 | End: 2019-01-01

## 2019-01-01 RX ORDER — FUROSEMIDE 40 MG/1
40 TABLET ORAL 2 TIMES DAILY
Qty: 180 TABLET | Refills: 2 | Status: SHIPPED | OUTPATIENT
Start: 2019-01-01

## 2019-01-01 RX ORDER — FUROSEMIDE 40 MG/1
40 TABLET ORAL 2 TIMES DAILY
Status: DISCONTINUED | OUTPATIENT
Start: 2019-01-01 | End: 2019-01-01 | Stop reason: HOSPADM

## 2019-01-01 RX ORDER — LEVOTHYROXINE SODIUM 137 UG/1
137 TABLET ORAL DAILY
Status: DISCONTINUED | OUTPATIENT
Start: 2019-01-01 | End: 2019-01-01 | Stop reason: HOSPADM

## 2019-01-01 RX ORDER — FLUTICASONE PROPIONATE 110 UG/1
1 AEROSOL, METERED RESPIRATORY (INHALATION) 2 TIMES DAILY
Qty: 3 INHALER | Refills: 0 | Status: SHIPPED | OUTPATIENT
Start: 2019-01-01

## 2019-01-01 RX ORDER — LORAZEPAM 0.5 MG/1
0.5 TABLET ORAL 2 TIMES DAILY
Status: DISCONTINUED | OUTPATIENT
Start: 2019-01-01 | End: 2019-01-01 | Stop reason: HOSPADM

## 2019-01-01 RX ORDER — LIDOCAINE 50 MG/G
1 PATCH TOPICAL DAILY
Qty: 30 PATCH | Refills: 2 | Status: SHIPPED | OUTPATIENT
Start: 2019-01-01

## 2019-01-01 RX ORDER — TIZANIDINE 2 MG/1
2 TABLET ORAL 2 TIMES DAILY PRN
Status: DISCONTINUED | OUTPATIENT
Start: 2019-01-01 | End: 2019-01-01 | Stop reason: HOSPADM

## 2019-01-01 RX ORDER — POTASSIUM CHLORIDE 20 MEQ/1
20 TABLET, EXTENDED RELEASE ORAL 3 TIMES DAILY
Status: DISCONTINUED | OUTPATIENT
Start: 2019-01-01 | End: 2019-01-01 | Stop reason: HOSPADM

## 2019-01-01 RX ORDER — TRAZODONE HYDROCHLORIDE 50 MG/1
25 TABLET ORAL NIGHTLY
Qty: 30 TABLET | Refills: 3 | Status: SHIPPED | OUTPATIENT
Start: 2019-01-01 | End: 2019-01-01 | Stop reason: SINTOL

## 2019-01-01 RX ORDER — FLUTICASONE PROPIONATE 110 UG/1
1 AEROSOL, METERED RESPIRATORY (INHALATION) 2 TIMES DAILY
Status: DISCONTINUED | OUTPATIENT
Start: 2019-01-01 | End: 2019-01-01 | Stop reason: HOSPADM

## 2019-01-01 RX ORDER — ALBUTEROL SULFATE 2.5 MG/3ML
2.5 SOLUTION RESPIRATORY (INHALATION) EVERY 6 HOURS PRN
Qty: 120 VIAL | Refills: 11 | Status: SHIPPED | OUTPATIENT
Start: 2019-01-01

## 2019-01-01 RX ORDER — DICYCLOMINE HYDROCHLORIDE 10 MG/1
10 CAPSULE ORAL 2 TIMES DAILY
Status: DISCONTINUED | OUTPATIENT
Start: 2019-01-01 | End: 2019-01-01 | Stop reason: HOSPADM

## 2019-01-01 RX ORDER — DULOXETIN HYDROCHLORIDE 30 MG/1
30 CAPSULE, DELAYED RELEASE ORAL DAILY
Status: DISCONTINUED | OUTPATIENT
Start: 2019-01-01 | End: 2019-01-01 | Stop reason: HOSPADM

## 2019-01-01 RX ORDER — MECLIZINE HYDROCHLORIDE 25 MG/1
12.5 TABLET ORAL 2 TIMES DAILY PRN
Status: DISCONTINUED | OUTPATIENT
Start: 2019-01-01 | End: 2019-01-01 | Stop reason: HOSPADM

## 2019-01-01 RX ORDER — ACETAMINOPHEN 325 MG/1
650 TABLET ORAL EVERY 4 HOURS PRN
Status: DISCONTINUED | OUTPATIENT
Start: 2019-01-01 | End: 2019-01-01 | Stop reason: HOSPADM

## 2019-01-01 RX ORDER — UBIDECARENONE 75 MG
50 CAPSULE ORAL DAILY
Status: DISCONTINUED | OUTPATIENT
Start: 2019-01-01 | End: 2019-01-01 | Stop reason: HOSPADM

## 2019-01-01 RX ORDER — CHOLESTYRAMINE 4 G/9G
1 POWDER, FOR SUSPENSION ORAL 3 TIMES DAILY
COMMUNITY
End: 2019-01-01 | Stop reason: ALTCHOICE

## 2019-01-01 RX ORDER — LOPERAMIDE HYDROCHLORIDE 2 MG/1
2 CAPSULE ORAL DAILY PRN
Status: DISCONTINUED | OUTPATIENT
Start: 2019-01-01 | End: 2019-01-01 | Stop reason: HOSPADM

## 2019-01-01 RX ORDER — FUROSEMIDE 10 MG/ML
40 INJECTION INTRAMUSCULAR; INTRAVENOUS ONCE
Status: COMPLETED | OUTPATIENT
Start: 2019-01-01 | End: 2019-01-01

## 2019-01-01 RX ORDER — GABAPENTIN 400 MG/1
400 CAPSULE ORAL 3 TIMES DAILY
Qty: 270 CAPSULE | Refills: 3 | Status: SHIPPED | OUTPATIENT
Start: 2019-01-01 | End: 2019-05-23

## 2019-01-01 RX ORDER — LOSARTAN POTASSIUM 100 MG/1
100 TABLET ORAL DAILY
Status: DISCONTINUED | OUTPATIENT
Start: 2019-01-01 | End: 2019-01-01 | Stop reason: HOSPADM

## 2019-01-01 RX ORDER — SOTALOL HYDROCHLORIDE 80 MG/1
TABLET ORAL
COMMUNITY
Start: 2019-01-01

## 2019-01-01 RX ORDER — IPRATROPIUM BROMIDE AND ALBUTEROL SULFATE 2.5; .5 MG/3ML; MG/3ML
1 SOLUTION RESPIRATORY (INHALATION) EVERY 4 HOURS PRN
Status: DISCONTINUED | OUTPATIENT
Start: 2019-01-01 | End: 2019-01-01 | Stop reason: HOSPADM

## 2019-01-01 RX ORDER — AZITHROMYCIN 250 MG/1
250 TABLET, FILM COATED ORAL SEE ADMIN INSTRUCTIONS
Qty: 6 TABLET | Refills: 0 | Status: SHIPPED | OUTPATIENT
Start: 2019-01-01 | End: 2019-01-01

## 2019-01-01 RX ORDER — LOSARTAN POTASSIUM 50 MG/1
50 TABLET ORAL DAILY
COMMUNITY
Start: 2019-01-01 | End: 2019-01-01 | Stop reason: ALTCHOICE

## 2019-01-01 RX ORDER — CETIRIZINE HYDROCHLORIDE 10 MG/1
10 TABLET ORAL DAILY
Status: DISCONTINUED | OUTPATIENT
Start: 2019-01-01 | End: 2019-01-01 | Stop reason: HOSPADM

## 2019-01-01 RX ORDER — METOPROLOL SUCCINATE 25 MG/1
25 TABLET, EXTENDED RELEASE ORAL DAILY
Qty: 30 TABLET | Refills: 5
Start: 2019-01-01

## 2019-01-01 RX ORDER — 0.9 % SODIUM CHLORIDE 0.9 %
250 INTRAVENOUS SOLUTION INTRAVENOUS ONCE
Status: DISCONTINUED | OUTPATIENT
Start: 2019-01-01 | End: 2019-01-01 | Stop reason: HOSPADM

## 2019-01-01 RX ORDER — LEVOTHYROXINE SODIUM 137 UG/1
TABLET ORAL
Qty: 30 TABLET | Refills: 3 | Status: SHIPPED | OUTPATIENT
Start: 2019-01-01

## 2019-01-01 RX ORDER — PANTOPRAZOLE SODIUM 40 MG/1
40 TABLET, DELAYED RELEASE ORAL DAILY
Status: DISCONTINUED | OUTPATIENT
Start: 2019-01-01 | End: 2019-01-01 | Stop reason: HOSPADM

## 2019-01-01 RX ORDER — GABAPENTIN 400 MG/1
400 CAPSULE ORAL 3 TIMES DAILY
Status: DISCONTINUED | OUTPATIENT
Start: 2019-01-01 | End: 2019-01-01 | Stop reason: HOSPADM

## 2019-01-01 RX ADMIN — POTASSIUM CHLORIDE 20 MEQ: 20 TABLET, EXTENDED RELEASE ORAL at 14:34

## 2019-01-01 RX ADMIN — GABAPENTIN 400 MG: 400 CAPSULE ORAL at 21:30

## 2019-01-01 RX ADMIN — FLUTICASONE PROPIONATE 1 PUFF: 110 AEROSOL, METERED RESPIRATORY (INHALATION) at 21:30

## 2019-01-01 RX ADMIN — DICYCLOMINE HYDROCHLORIDE 10 MG: 10 CAPSULE ORAL at 21:30

## 2019-01-01 RX ADMIN — FLUTICASONE PROPIONATE 1 PUFF: 110 AEROSOL, METERED RESPIRATORY (INHALATION) at 09:42

## 2019-01-01 RX ADMIN — LOSARTAN POTASSIUM 100 MG: 100 TABLET ORAL at 09:42

## 2019-01-01 RX ADMIN — GABAPENTIN 400 MG: 400 CAPSULE ORAL at 09:41

## 2019-01-01 RX ADMIN — METOPROLOL TARTRATE 12.5 MG: 25 TABLET ORAL at 21:30

## 2019-01-01 RX ADMIN — FUROSEMIDE 40 MG: 40 TABLET ORAL at 09:42

## 2019-01-01 RX ADMIN — CETIRIZINE HYDROCHLORIDE 10 MG: 10 TABLET, FILM COATED ORAL at 09:41

## 2019-01-01 RX ADMIN — LORAZEPAM 0.5 MG: 0.5 TABLET ORAL at 09:41

## 2019-01-01 RX ADMIN — SODIUM CHLORIDE 250 ML: 9 INJECTION, SOLUTION INTRAVENOUS at 08:29

## 2019-01-01 RX ADMIN — Medication 400 MG: at 09:42

## 2019-01-01 RX ADMIN — VITAM B12 50 MCG: 100 TAB at 09:47

## 2019-01-01 RX ADMIN — GABAPENTIN 400 MG: 400 CAPSULE ORAL at 14:33

## 2019-01-01 RX ADMIN — DICYCLOMINE HYDROCHLORIDE 10 MG: 10 CAPSULE ORAL at 09:42

## 2019-01-01 RX ADMIN — LEVOTHYROXINE SODIUM 137 MCG: 137 TABLET ORAL at 06:51

## 2019-01-01 RX ADMIN — METOPROLOL TARTRATE 12.5 MG: 25 TABLET ORAL at 09:41

## 2019-01-01 RX ADMIN — FUROSEMIDE 40 MG: 10 INJECTION, SOLUTION INTRAMUSCULAR; INTRAVENOUS at 10:37

## 2019-01-01 RX ADMIN — PANTOPRAZOLE SODIUM 40 MG: 40 TABLET, DELAYED RELEASE ORAL at 09:42

## 2019-01-01 RX ADMIN — POTASSIUM CHLORIDE 20 MEQ: 20 TABLET, EXTENDED RELEASE ORAL at 09:41

## 2019-01-01 RX ADMIN — DULOXETINE 30 MG: 30 CAPSULE, DELAYED RELEASE ORAL at 09:41

## 2019-01-01 RX ADMIN — POTASSIUM CHLORIDE 20 MEQ: 20 TABLET, EXTENDED RELEASE ORAL at 21:30

## 2019-01-01 ASSESSMENT — ENCOUNTER SYMPTOMS
COUGH: 1
CONSTIPATION: 0
CHEST TIGHTNESS: 0
SINUS PAIN: 0
DIARRHEA: 0
TROUBLE SWALLOWING: 0
BACK PAIN: 1
BACK PAIN: 1
EYE PAIN: 1
COUGH: 0
VOMITING: 0
COUGH: 0
RECTAL PAIN: 0
SHORTNESS OF BREATH: 1
NAUSEA: 0
CONSTIPATION: 0
SHORTNESS OF BREATH: 1
NAUSEA: 1
SINUS PAIN: 1
ABDOMINAL DISTENTION: 0
CONSTIPATION: 0
WHEEZING: 1
PHOTOPHOBIA: 0
DIARRHEA: 0
BLOOD IN STOOL: 0
COUGH: 1
BACK PAIN: 1
SORE THROAT: 0
ABDOMINAL PAIN: 1
VOMITING: 0
EYE REDNESS: 0
SHORTNESS OF BREATH: 1
ABDOMINAL PAIN: 0
SHORTNESS OF BREATH: 0
COLOR CHANGE: 0
SINUS PRESSURE: 1
NAUSEA: 0
PHOTOPHOBIA: 0
DIARRHEA: 1
CONSTIPATION: 0
BLOOD IN STOOL: 1
TROUBLE SWALLOWING: 0
ANAL BLEEDING: 1
SINUS PRESSURE: 0
NAUSEA: 0
WHEEZING: 0
SINUS PAIN: 0
DIARRHEA: 0
WHEEZING: 0

## 2019-01-01 ASSESSMENT — PAIN SCALES - GENERAL: PAINLEVEL_OUTOF10: 0

## 2019-01-01 ASSESSMENT — PATIENT HEALTH QUESTIONNAIRE - PHQ9
SUM OF ALL RESPONSES TO PHQ QUESTIONS 1-9: 0
SUM OF ALL RESPONSES TO PHQ QUESTIONS 1-9: 0
2. FEELING DOWN, DEPRESSED OR HOPELESS: 0
1. LITTLE INTEREST OR PLEASURE IN DOING THINGS: 0
SUM OF ALL RESPONSES TO PHQ9 QUESTIONS 1 & 2: 0

## 2019-01-03 PROBLEM — K62.5 RECTAL BLEEDING: Status: ACTIVE | Noted: 2019-01-01

## 2019-01-03 PROBLEM — R19.7 DIARRHEA: Status: ACTIVE | Noted: 2019-01-01

## 2019-01-03 PROBLEM — R10.9 ABDOMINAL PAIN: Status: ACTIVE | Noted: 2019-01-01

## 2019-01-05 PROBLEM — D64.9 ACUTE ANEMIA: Status: ACTIVE | Noted: 2019-01-01

## 2019-01-11 NOTE — FLOWSHEET NOTE
Assessment:  Ms. Edmond Garcia is a 79 y/o female whom  met for the fist time today while Pt was receiving an infusion in treatment cubicle. Pt smiled and indicated that she was coping. Pt's daughter was with her but had stepped out. Pt identified her daughter and her supportive living community as a source of support. Pt stated that what has kept her going has been to [employer] and to \"find things you enjoy doing. \" Pt mentioned some of the things she enjoys, including \"computer bowling. \" Pt shared that she is Evangelical. Pt was receptive to a copy of \"Our Daily Bread. \"     Intervention:   provided supportive presence, active listening, exploration of coping, needs, and resources, affirmation of Pt's strengths, and a copy of \"Our Daily Bread. \"    Outcome:  Patient received 's visit. Pt named some of her sources of support and strength. Pt received a copy of \"Our Daily Bread\" and thanked  for the visit. Recommendations:  Chaplains will be available for emotional and spiritual support as needed. 01/11/19 1126   Encounter Summary   Services provided to: Patient   Referral/Consult From: [facility]; Children   Continue Visiting (1/11/19)   Complexity of Encounter Low   Length of Encounter 15 minutes   Spiritual Assessment Completed Yes   Spiritual/Buddhist   Type Spiritual support   Assessment Approachable; Hopeful;Coping   Intervention Explored feelings, thoughts, concerns; Active listening;Explored coping resources;Provided reading materials/devotional materials;Sustaining presence/ Ministry of presence   Outcome Expressed gratitude;Engaged in conversation;Coping; Hopeful;Encouraged     Electronically signed by Roxane Duverney, [facility], [id number]  1/11/2019  11:28 AM

## 2019-03-14 NOTE — TELEPHONE ENCOUNTER
[facility] called to give verbal for medication changes     Lasix 60 MG 2 X's per day for one week and then resume normal usage     Toprol 25 MG daily

## 2019-03-14 NOTE — TELEPHONE ENCOUNTER
[place] [facility] daughter called stating that the patient was a little SOB but she thinks its because she is in a panic. They requested a appt so I scheduled for 10:15 tomorrow per Samantha. Pt's daughter thinks this will help calm her down. Pt went to the cardiologist yesterday and had medication changes that were supposed to be sent to us and we never received them yet. I will call her cardiologist and get those orders.      TISH

## 2019-03-16 PROBLEM — D47.2 IGM LAMBDA MONOCLONAL GAMMOPATHY: Status: ACTIVE | Noted: 2019-01-01

## 2019-03-16 PROBLEM — I49.5 SINUS NODE DYSFUNCTION (HCC): Status: ACTIVE | Noted: 2018-01-01

## 2019-03-16 PROBLEM — N18.4 STAGE 4 CHRONIC KIDNEY DISEASE (HCC): Status: ACTIVE | Noted: 2019-01-01
